# Patient Record
(demographics unavailable — no encounter records)

---

## 2025-02-12 NOTE — REASON FOR VISIT
[Initial Evaluation] : an initial evaluation [Parents] : parents [FreeTextEntry1] : left hip congenital dislocation [TWNoteComboBox1] : Citizen of [place]

## 2025-02-12 NOTE — DATA REVIEWED
[de-identified] : AP FROM PELVIS  radiographs  ZWENGER were  independently reviewed during today's visit LEFT HIP  dislocation , no ossific nucleus Right hip is normal

## 2025-02-12 NOTE — REVIEW OF SYSTEMS
[Change in Activity] : no change in activity [Fever Above 102] : no fever [Rash] : no rash [Itching] : no itching [Sore Throat] : no sore throat [Wheezing] : no wheezing [Vomiting] : no vomiting [Joint Pains] : no arthralgias

## 2025-02-12 NOTE — ASSESSMENT
[FreeTextEntry1] : 9 months old baby girl with left hip congenital dislocation Today's visit included obtaining history from the child  parent due to the child's age, the child could not be considered a reliable historian, requiring parent to act as independent historian. Xray was reviewed today demonstrating  complete left hip dislocation and Long discussion was done with family regarding  diagnosis, treatment options and prognosis Justus need surgery to put the hip back in place We discussed the need for closed reduction, arthrogram and possible adductor tenotomy and application of a spica cast for 3 months I also explained her that the closed reduction may not work and we will need to come later for more extensive suregry I explained  them  surgical procedure, alternative treatment options, possible complication, post operative management. This plan was discussed with family. Family verbalizes understanding and agreement of plan. All questions and concerns were addressed today.

## 2025-02-12 NOTE — HISTORY OF PRESENT ILLNESS
[FreeTextEntry1] : Justus is a  pleasant  9 months old brought by mom for evaluation of left hip congenital dislocation She is a product of [place] pregnancy but never had US in the past few weeks mom noticed that the left leg is shorter than the right leg, she was sent to pelvis Xray ( sung) and she was find to have complete left hip dislocation and she was sent to pediatric ortho She is her today for management of the above

## 2025-02-12 NOTE — DEVELOPMENTAL MILESTONES
[Roll Over: ___ Months] : Roll Over: [unfilled] months [Pull Self to Stand ___ Months] : Pull self to stand: [unfilled] months

## 2025-02-12 NOTE — PHYSICAL EXAM
[FreeTextEntry1] : Well-developed, well-nourished baby  girl in no acute distress.  Patient is awake and alert and appears to be resting comfortably.  The head is normocephalic, atraumatic with full range of motion of the cervical spine with no pain. Eyes are clear with no sclera abnormalities. Ears, nose and mouth are clear.   The child is moving all limbs spontaneously.  Full range of motion of bilateral upper extremities.  Moving b/l upper and lower extremities.  The pulses are 2+ at both wrists.   Lower extremity: left leg is shorter than the right hip with + Galeazzi  very limited hip abduction. not able to perform Ortolani manvure. Sensation is grossly intact in bilateral upper and lower extremities.  Pulses are 2+ at both feet.  There are no palpable masses, warmth, or tenderness in bilateral upper and lower extremities.  Spine is grossly midline and shows no deformity, chase of hair or dimples MOLECULAR PCR

## 2025-02-20 NOTE — DATA REVIEWED
[de-identified] : AP FROM PELVIS  radiographs  ZWENGER were  independently reviewed during today's visit LEFT HIP  dislocation , no ossific nucleus Right hip is normal

## 2025-02-20 NOTE — HISTORY OF PRESENT ILLNESS
[FreeTextEntry1] : Justus is a  pleasant  9 months old brought by mom for evaluation of left hip congenital dislocation She is a product of [place] pregnancy but never had US in the past few weeks mom noticed that the left leg is shorter than the right leg, she was sent to pelvis Xray ( sung) and she was find to have complete left hip dislocation and she was sent to pediatric ortho He is her today for  discuss again surgery

## 2025-02-20 NOTE — REASON FOR VISIT
[Follow Up] : a follow up visit [Parents] : parents [FreeTextEntry1] : left hip congenital dislocation [TWNoteComboBox1] : [place]

## 2025-02-20 NOTE — PHYSICAL EXAM
[FreeTextEntry1] : Well-developed, well-nourished baby boy in no acute distress.  Patient is awake and alert and appears to be resting comfortably.  The head is normocephalic, atraumatic with full range of motion of the cervical spine with no pain. Eyes are clear with no sclera abnormalities. Ears, nose and mouth are clear.   The child is moving all limbs spontaneously.  Full range of motion of bilateral upper extremities.  Moving b/l upper and lower extremities.  The pulses are 2+ at both wrists.   Lower extremity: left leg is shorter than the right hip with + Galeazzi  very limited hip abduction. not able to perform Ortolani manvure. Sensation is grossly intact in bilateral upper and lower extremities.  Pulses are 2+ at both feet.  There are no palpable masses, warmth, or tenderness in bilateral upper and lower extremities.  Spine is grossly midline and shows no deformity, chase of hair or dimples

## 2025-03-25 NOTE — POST OP
[Doing Well] : is doing well [Excellent Pain Control] : has excellent pain control [de-identified] : 11 months old boy with congenital  left hip dislocation S/P closed reduction, adductor tenotomy DOS 03/19/25 [de-identified] : The a is a pleasant 11-month-old baby, who came to my office at AGE 10 MONTHS.  Mom was concerned that the left leg is shorter than the right side and less mobile.  X-ray was done and complete congenital dislocation  of the left hip was diagnosed and he was indicated for surgery He underwent the above procedure with no complications here today, per mom doing well no pain tolerate the spica cast very well [de-identified] : patient is in no acute distress  cast dry and clean. well fitted. no skin irritation from cast edges. NV intact. moves all his toes, sensation intact, normal capillary refill. [de-identified] : AP/FROG pelvis  radiographs were obtained  and independently reviewed during today's visit 03/25/25  hip are well located [de-identified] : 11 months old boy with congenital  left hip dislocation S/P closed reduction, adductor tenotomy DOS 03/19/25 [de-identified] :  cast  for 3 months we will change it in OR in 5 weeks  KEEP  CAST DRY AND CLEAN FOLLOW up in 2 weeks for Xray in cast This plan was discussed with family. Family verbalizes understanding and agreement of plan. All questions and concerns were addressed today.

## 2025-05-13 NOTE — POST OP
[Doing Well] : is doing well [Excellent Pain Control] : has excellent pain control [de-identified] : 12 months old boy with congenital  left hip dislocation S/P closed reduction, adductor tenotomy DOS 03/19/25 and than spica cast exchange on 04/30/25 [de-identified] : The a is a pleasant 12-month-old baby, who came to my office at AGE 10 MONTHS.  Mom was concerned that the left leg is shorter than the right side and less mobile.  X-ray was done and complete congenital dislocation  of the left hip was diagnosed and he was indicated for surgery He underwent the above procedure with no complications here today, per mom doing well no pain tolerate the spica cast very well [de-identified] : patient is in no acute distress  cast dry and clean. well fitted. no skin irritation from cast edges. NV intact. moves all his toes, sensation intact, normal capillary refill. [de-identified] : AP/FROG pelvis  radiographs were obtained  and independently reviewed during today's visit  05/13/25   hip are well located [de-identified] : 12 months old boy with congenital  left hip dislocation S/P closed reduction, adductor tenotomy DOS 03/19/25 [de-identified] :  cast  for 6 weeks we will removed it in 4 weeks  KEEP  CAST DRY AND CLEAN  follow up in 4 weeks for cast removal Xray OOC and convert him to Rhino brace This plan was discussed with family. Family verbalizes understanding and agreement of plan. All questions and concerns were addressed today

## 2025-06-10 NOTE — POST OP
[Doing Well] : is doing well [Excellent Pain Control] : has excellent pain control [de-identified] : 13 months old boy with congenital  left hip dislocation S/P closed reduction, adductor tenotomy DOS 03/19/25 and than spica cast exchange on 04/30/25 [de-identified] : The a is a pleasant 13-month-old baby, who came to my office at AGE 10 MONTHS.  Mom was concerned that the left leg is shorter than the right side and less mobile.  X-ray was done and complete congenital dislocation  of the left hip was diagnosed and he was indicated for surgery He underwent the above procedure with no complications here today, per mom doing well no pain tolerate the spica cast very well [de-identified] : patient is in no acute distress  cast dry and clean.  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 Family verbalizes understanding and agreement of plan. All questions and concerns were addressed today.

## 2025-07-01 NOTE — POST OP
[Doing Well] : is doing well [Excellent Pain Control] : has excellent pain control [de-identified] : 14 months old boy with congenital  left hip dislocation S/P closed reduction, adductor tenotomy DOS 03/19/25 and than spica cast exchange on 04/30/25 [de-identified] : The a is a pleasant 13-month-old baby, who came to my office at AGE 10 MONTHS.  Mom was concerned that the left leg is shorter than the right side and less mobile.  X-ray was done and complete congenital dislocation  of the left hip was diagnosed and he was indicated for surgery He underwent the above procedure with no complications last visit cast was removed and he was converted to a rhino rigid brace for full time here today, per mom doing well no pain tolerate the Rhino  very well [de-identified] : patient is in no acute distress Both legs are in the same length, no LLD  symmetric ROM of both hips, NV intact [de-identified] : AP/FROG pelvis  radiographs were obtained  and independently reviewed during today's visit   07/01/ 25 hip are well located  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 Family verbalizes understanding and agreement of plan. All questions and concerns were addressed today.